# Patient Record
Sex: FEMALE | Race: WHITE | NOT HISPANIC OR LATINO | Employment: FULL TIME | URBAN - NONMETROPOLITAN AREA
[De-identification: names, ages, dates, MRNs, and addresses within clinical notes are randomized per-mention and may not be internally consistent; named-entity substitution may affect disease eponyms.]

---

## 2022-01-10 ENCOUNTER — TELEPHONE (OUTPATIENT)
Dept: UROLOGY | Facility: CLINIC | Age: 37
End: 2022-01-10

## 2022-02-11 ENCOUNTER — OFFICE VISIT (OUTPATIENT)
Dept: GASTROENTEROLOGY | Facility: CLINIC | Age: 37
End: 2022-02-11

## 2022-02-11 VITALS
HEIGHT: 69 IN | WEIGHT: 148.6 LBS | HEART RATE: 78 BPM | DIASTOLIC BLOOD PRESSURE: 60 MMHG | SYSTOLIC BLOOD PRESSURE: 102 MMHG | BODY MASS INDEX: 22.01 KG/M2

## 2022-02-11 DIAGNOSIS — Z12.11 ENCOUNTER FOR SCREENING FOR MALIGNANT NEOPLASM OF COLON: Primary | ICD-10-CM

## 2022-02-11 PROCEDURE — S0285 CNSLT BEFORE SCREEN COLONOSC: HCPCS | Performed by: PHYSICIAN ASSISTANT

## 2022-02-11 RX ORDER — BISACODYL 5 MG/1
20 TABLET, DELAYED RELEASE ORAL ONCE
Qty: 4 TABLET | Refills: 0 | Status: SHIPPED | OUTPATIENT
Start: 2022-02-11 | End: 2022-02-11

## 2022-02-11 RX ORDER — MAGNESIUM CARB/ALUMINUM HYDROX 105-160MG
296 TABLET,CHEWABLE ORAL TAKE AS DIRECTED
Qty: 592 ML | Refills: 0 | Status: SHIPPED | OUTPATIENT
Start: 2022-02-11 | End: 2022-03-16 | Stop reason: HOSPADM

## 2022-02-11 NOTE — PROGRESS NOTES
Chief Complaint   Patient presents with   • Weight Loss       Rosibel Gan is a 36 y.o. female who presents to the office today for evaluation of Weight Loss  .    HPI  Patient presents the clinic today for colonoscopy and weight loss.  Patient states that she has a paternal uncle who was diagnosed with colon cancer in his early 30s and passed due to it.  She is wanting to have a screening colonoscopy performed.  Patient's father also has had several colon polyps removed-unsure if these are precancerous or not.  Patient states that she also had Covid back in July and had a 10 to 15 pound weight loss that has leveled out.  Patient denies any issues with appetite.  She has since gained some of that weight back.    Review of Systems   Constitutional: Positive for unexpected weight change.   HENT: Negative for trouble swallowing.    Eyes: Negative.    Respiratory: Negative for chest tightness.    Cardiovascular: Negative for chest pain.   Gastrointestinal: Positive for abdominal distention and constipation. Negative for abdominal pain, anal bleeding, blood in stool, diarrhea, nausea and vomiting.   Endocrine: Negative.    Genitourinary: Negative for difficulty urinating.   Musculoskeletal: Negative.    Skin: Negative.    Allergic/Immunologic: Negative.    Neurological: Negative for headaches.   Hematological: Does not bruise/bleed easily.   Psychiatric/Behavioral: Negative.        ACTIVE PROBLEMS:   Specialty Problems     None          PAST MEDICAL HISTORY:  History reviewed. No pertinent past medical history.    SURGICAL HISTORY:  Past Surgical History:   Procedure Laterality Date   • ECTOPIC PREGNANCY SURGERY     • TONSILLECTOMY         FAMILY HISTORY:  Family History   Problem Relation Age of Onset   • Colon cancer Paternal Uncle        SOCIAL HISTORY:  Social History     Tobacco Use   • Smoking status: Never Smoker   • Smokeless tobacco: Never Used   Substance Use Topics   • Alcohol use: Not on file       CURRENT  "MEDICATION:    Current Outpatient Medications:   •  magnesium citrate 1.745 GM/30ML solution solution, Take 296 mL by mouth Take As Directed for 2 doses. Take one full bottle at 4:00 PM and take second bottle at 10:00 PM., Disp: 592 mL, Rfl: 0    ALLERGIES:  Patient has no known allergies.    VISIT VITALS:  /60 (BP Location: Left arm, Patient Position: Sitting, Cuff Size: Adult)   Pulse 78   Ht 175.3 cm (69\")   Wt 67.4 kg (148 lb 9.6 oz)   BMI 21.94 kg/m²   Physical Exam  Constitutional:       General: She is not in acute distress.     Appearance: Normal appearance. She is well-developed.   HENT:      Head: Normocephalic and atraumatic.   Eyes:      Pupils: Pupils are equal, round, and reactive to light.   Cardiovascular:      Rate and Rhythm: Normal rate and regular rhythm.      Heart sounds: Normal heart sounds.   Pulmonary:      Effort: Pulmonary effort is normal. No respiratory distress.      Breath sounds: Normal breath sounds. No wheezing, rhonchi or rales.   Abdominal:      General: Abdomen is flat. Bowel sounds are normal. There is no distension.      Palpations: Abdomen is soft. There is no mass.      Tenderness: There is no abdominal tenderness. There is no guarding or rebound.      Hernia: No hernia is present.   Musculoskeletal:         General: No swelling. Normal range of motion.      Cervical back: Normal range of motion and neck supple.      Right lower leg: No edema.      Left lower leg: No edema.   Skin:     General: Skin is warm and dry.   Neurological:      Mental Status: She is alert and oriented to person, place, and time.   Psychiatric:         Attention and Perception: Attention normal.         Mood and Affect: Mood normal.         Speech: Speech normal.         Behavior: Behavior normal. Behavior is cooperative.         Thought Content: Thought content normal.         Assessment/Plan   I will go ahead and get the patient set up to have a screening colonoscopy performed by " Tristan using a magnesium citrate Dulcolax colon prep..  The procedure was thoroughly explained to the patient she voiced understanding and agreement to the treatment plan.   Diagnosis Plan   1. Encounter for screening for malignant neoplasm of colon  magnesium citrate 1.745 GM/30ML solution solution    bisacodyl (DULCOLAX) 5 MG EC tablet    Case Request    Case Request       Return After procedure.                   This document has been electronically signed by Darvin Noguera PA-C  February 14, 2022 12:49 EST    Part of this note may be an electronic transcription/translation of spoken language to printed text using the Dragon Dictation System.

## 2022-02-16 DIAGNOSIS — Z12.11 ENCOUNTER FOR SCREENING FOR MALIGNANT NEOPLASM OF COLON: Primary | ICD-10-CM

## 2022-03-08 PROBLEM — Z12.11 ENCOUNTER FOR SCREENING FOR MALIGNANT NEOPLASM OF COLON: Status: ACTIVE | Noted: 2022-03-08

## 2022-03-14 ENCOUNTER — APPOINTMENT (OUTPATIENT)
Dept: LAB | Facility: HOSPITAL | Age: 37
End: 2022-03-14

## 2022-03-14 ENCOUNTER — CLINICAL SUPPORT (OUTPATIENT)
Dept: FAMILY MEDICINE CLINIC | Facility: CLINIC | Age: 37
End: 2022-03-14

## 2022-03-14 DIAGNOSIS — Z12.11 ENCOUNTER FOR SCREENING FOR MALIGNANT NEOPLASM OF COLON: ICD-10-CM

## 2022-03-14 PROCEDURE — U0004 COV-19 TEST NON-CDC HGH THRU: HCPCS | Performed by: INTERNAL MEDICINE

## 2022-03-15 LAB — SARS-COV-2 RNA PNL SPEC NAA+PROBE: NOT DETECTED

## 2022-03-16 ENCOUNTER — HOSPITAL ENCOUNTER (OUTPATIENT)
Facility: HOSPITAL | Age: 37
Setting detail: HOSPITAL OUTPATIENT SURGERY
Discharge: HOME | End: 2022-03-16
Attending: INTERNAL MEDICINE | Admitting: INTERNAL MEDICINE

## 2022-03-16 ENCOUNTER — ANESTHESIA EVENT (OUTPATIENT)
Dept: PERIOP | Facility: HOSPITAL | Age: 37
End: 2022-03-16

## 2022-03-16 ENCOUNTER — ANESTHESIA (OUTPATIENT)
Dept: PERIOP | Facility: HOSPITAL | Age: 37
End: 2022-03-16

## 2022-03-16 VITALS
TEMPERATURE: 97.8 F | OXYGEN SATURATION: 98 % | RESPIRATION RATE: 20 BRPM | WEIGHT: 145 LBS | BODY MASS INDEX: 21.48 KG/M2 | HEIGHT: 69 IN | SYSTOLIC BLOOD PRESSURE: 100 MMHG | DIASTOLIC BLOOD PRESSURE: 64 MMHG | HEART RATE: 69 BPM

## 2022-03-16 DIAGNOSIS — Z12.11 ENCOUNTER FOR SCREENING FOR MALIGNANT NEOPLASM OF COLON: ICD-10-CM

## 2022-03-16 LAB
B-HCG UR QL: NEGATIVE
EXPIRATION DATE: NORMAL
INTERNAL NEGATIVE CONTROL: NEGATIVE
INTERNAL POSITIVE CONTROL: POSITIVE
Lab: NORMAL

## 2022-03-16 PROCEDURE — 25010000002 PROPOFOL 10 MG/ML EMULSION: Performed by: NURSE ANESTHETIST, CERTIFIED REGISTERED

## 2022-03-16 PROCEDURE — 81025 URINE PREGNANCY TEST: CPT | Performed by: ANESTHESIOLOGY

## 2022-03-16 PROCEDURE — 25010000002 FENTANYL CITRATE (PF) 50 MCG/ML SOLUTION: Performed by: NURSE ANESTHETIST, CERTIFIED REGISTERED

## 2022-03-16 PROCEDURE — 45378 DIAGNOSTIC COLONOSCOPY: CPT | Performed by: INTERNAL MEDICINE

## 2022-03-16 RX ORDER — SODIUM CHLORIDE, SODIUM LACTATE, POTASSIUM CHLORIDE, CALCIUM CHLORIDE 600; 310; 30; 20 MG/100ML; MG/100ML; MG/100ML; MG/100ML
125 INJECTION, SOLUTION INTRAVENOUS ONCE
Status: COMPLETED | OUTPATIENT
Start: 2022-03-16 | End: 2022-03-16

## 2022-03-16 RX ORDER — SODIUM CHLORIDE 0.9 % (FLUSH) 0.9 %
10 SYRINGE (ML) INJECTION AS NEEDED
Status: DISCONTINUED | OUTPATIENT
Start: 2022-03-16 | End: 2022-03-16 | Stop reason: HOSPADM

## 2022-03-16 RX ORDER — SODIUM CHLORIDE 0.9 % (FLUSH) 0.9 %
10 SYRINGE (ML) INJECTION EVERY 12 HOURS SCHEDULED
Status: DISCONTINUED | OUTPATIENT
Start: 2022-03-16 | End: 2022-03-16 | Stop reason: HOSPADM

## 2022-03-16 RX ORDER — KETOROLAC TROMETHAMINE 30 MG/ML
30 INJECTION, SOLUTION INTRAMUSCULAR; INTRAVENOUS EVERY 6 HOURS PRN
Status: DISCONTINUED | OUTPATIENT
Start: 2022-03-16 | End: 2022-03-16 | Stop reason: HOSPADM

## 2022-03-16 RX ORDER — DROPERIDOL 2.5 MG/ML
0.62 INJECTION, SOLUTION INTRAMUSCULAR; INTRAVENOUS ONCE AS NEEDED
Status: DISCONTINUED | OUTPATIENT
Start: 2022-03-16 | End: 2022-03-16 | Stop reason: HOSPADM

## 2022-03-16 RX ORDER — PROPOFOL 10 MG/ML
VIAL (ML) INTRAVENOUS CONTINUOUS PRN
Status: DISCONTINUED | OUTPATIENT
Start: 2022-03-16 | End: 2022-03-16 | Stop reason: SURG

## 2022-03-16 RX ORDER — SODIUM CHLORIDE, SODIUM LACTATE, POTASSIUM CHLORIDE, CALCIUM CHLORIDE 600; 310; 30; 20 MG/100ML; MG/100ML; MG/100ML; MG/100ML
100 INJECTION, SOLUTION INTRAVENOUS ONCE AS NEEDED
Status: DISCONTINUED | OUTPATIENT
Start: 2022-03-16 | End: 2022-03-16 | Stop reason: HOSPADM

## 2022-03-16 RX ORDER — MEPERIDINE HYDROCHLORIDE 25 MG/ML
12.5 INJECTION INTRAMUSCULAR; INTRAVENOUS; SUBCUTANEOUS
Status: DISCONTINUED | OUTPATIENT
Start: 2022-03-16 | End: 2022-03-16 | Stop reason: HOSPADM

## 2022-03-16 RX ORDER — FENTANYL CITRATE 50 UG/ML
50 INJECTION, SOLUTION INTRAMUSCULAR; INTRAVENOUS
Status: DISCONTINUED | OUTPATIENT
Start: 2022-03-16 | End: 2022-03-16 | Stop reason: HOSPADM

## 2022-03-16 RX ORDER — MIDAZOLAM HYDROCHLORIDE 1 MG/ML
1 INJECTION INTRAMUSCULAR; INTRAVENOUS
Status: DISCONTINUED | OUTPATIENT
Start: 2022-03-16 | End: 2022-03-16 | Stop reason: HOSPADM

## 2022-03-16 RX ORDER — ONDANSETRON 2 MG/ML
4 INJECTION INTRAMUSCULAR; INTRAVENOUS AS NEEDED
Status: DISCONTINUED | OUTPATIENT
Start: 2022-03-16 | End: 2022-03-16 | Stop reason: HOSPADM

## 2022-03-16 RX ORDER — OXYCODONE HYDROCHLORIDE AND ACETAMINOPHEN 5; 325 MG/1; MG/1
1 TABLET ORAL ONCE AS NEEDED
Status: DISCONTINUED | OUTPATIENT
Start: 2022-03-16 | End: 2022-03-16 | Stop reason: HOSPADM

## 2022-03-16 RX ORDER — FENTANYL CITRATE 50 UG/ML
INJECTION, SOLUTION INTRAMUSCULAR; INTRAVENOUS AS NEEDED
Status: DISCONTINUED | OUTPATIENT
Start: 2022-03-16 | End: 2022-03-16 | Stop reason: SURG

## 2022-03-16 RX ORDER — IPRATROPIUM BROMIDE AND ALBUTEROL SULFATE 2.5; .5 MG/3ML; MG/3ML
3 SOLUTION RESPIRATORY (INHALATION) ONCE AS NEEDED
Status: DISCONTINUED | OUTPATIENT
Start: 2022-03-16 | End: 2022-03-16 | Stop reason: HOSPADM

## 2022-03-16 RX ADMIN — SODIUM CHLORIDE, POTASSIUM CHLORIDE, SODIUM LACTATE AND CALCIUM CHLORIDE: 600; 310; 30; 20 INJECTION, SOLUTION INTRAVENOUS at 09:47

## 2022-03-16 RX ADMIN — FENTANYL CITRATE 50 MCG: 50 INJECTION INTRAMUSCULAR; INTRAVENOUS at 09:50

## 2022-03-16 RX ADMIN — EPHEDRINE SULFATE 5 MG: 50 INJECTION, SOLUTION INTRAVENOUS at 10:16

## 2022-03-16 RX ADMIN — FENTANYL CITRATE 50 MCG: 50 INJECTION INTRAMUSCULAR; INTRAVENOUS at 09:55

## 2022-03-16 RX ADMIN — EPHEDRINE SULFATE 5 MG: 50 INJECTION, SOLUTION INTRAVENOUS at 10:07

## 2022-03-16 RX ADMIN — PROPOFOL 200 MCG/KG/MIN: 10 INJECTION, EMULSION INTRAVENOUS at 09:50

## 2022-03-16 NOTE — ANESTHESIA PREPROCEDURE EVALUATION
Anesthesia Evaluation     Patient summary reviewed and Nursing notes reviewed   no history of anesthetic complications:  NPO Solid Status: > 8 hours  NPO Liquid Status: > 8 hours           Airway   Mallampati: II  TM distance: >3 FB  Neck ROM: full  No difficulty expected  Dental - normal exam     Pulmonary - negative pulmonary ROS and normal exam   Cardiovascular - negative cardio ROS and normal exam  Exercise tolerance: good (4-7 METS)    NYHA Classification: II        Neuro/Psych- negative ROS  GI/Hepatic/Renal/Endo - negative ROS     Musculoskeletal (-) negative ROS    Abdominal  - normal exam    Bowel sounds: normal.   Substance History - negative use     OB/GYN negative ob/gyn ROS         Other - negative ROS                     Anesthesia Plan    ASA 2     general     intravenous induction     Anesthetic plan, all risks, benefits, and alternatives have been provided, discussed and informed consent has been obtained with: patient.        CODE STATUS:

## 2022-03-16 NOTE — ANESTHESIA POSTPROCEDURE EVALUATION
Patient: Rosibel Gan    Procedure Summary     Date: 03/16/22 Room / Location:  [facility]  /  [facility]    Anesthesia Start: 0948 Anesthesia Stop: 1025    Procedure: COLONOSCOPY (N/A ) Diagnosis:       Encounter for screening for malignant neoplasm of colon      (Encounter for screening for malignant neoplasm of colon [Z12.11])    Surgeons: Teressa Rose MD Provider: Juan J Reese MD    Anesthesia Type: general ASA Status: 2          Anesthesia Type: general    Vitals  Vitals Value Taken Time   /64 03/16/22 1055   Temp 97.8 °F (36.6 °C) 03/16/22 1025   Pulse 69 03/16/22 1055   Resp 20 03/16/22 1055   SpO2 98 % 03/16/22 1055           Post Anesthesia Care and Evaluation    Patient location during evaluation: PHASE II  Patient participation: complete - patient participated  Level of consciousness: confused  Pain score: 0  Pain management: adequate  Airway patency: patent  Anesthetic complications: No anesthetic complications    Cardiovascular status: acceptable  Respiratory status: acceptable  Hydration status: acceptable

## 2022-03-16 NOTE — H&P
[facility]    Patient Identification:  Name:  Rosibel Gan  Age:  36 y.o.  Sex:  female  :  1985  MRN:  [id number]   Visit Number:  [id number]  Primary Care Physician:  Isabelle Nuñez MD       Chief complaint:     History of presenting illness:  36 y.o. female  presents the today for colonoscopy due to unusual weight loss.    She is very concerned because she had a paternal uncle who was diagnosed and ultimately  with colon cancer in his early 30s.  Her father has had colon polyps. Patient states that she also had Covid back in July and had a 10 to 15 pound weight loss that has leveled out.  Patient denies any issues with appetite.  She has since gained some of that weight back.    She denies diarrhea or constipation.  Patient has had no bright red blood per rectum or melena.  ---------------------------------------------------------------------------------------------------------------------   Review of Systems I have reviewed and confirmed the accuracy of the ROS as documented by the MA/ANNA MARIE/IAN Rose MD    ---------------------------------------------------------------------------------------------------------------------   History reviewed. No pertinent past medical history.  Past Surgical History:   Procedure Laterality Date   • ECTOPIC PREGNANCY SURGERY     • TONSILLECTOMY       Family History   Problem Relation Age of Onset   • Colon cancer Paternal Uncle      Social History     Socioeconomic History   • Marital status:    Tobacco Use   • Smoking status: Never Smoker   • Smokeless tobacco: Never Used   Vaping Use   • Vaping Use: Never used   Substance and Sexual Activity   • Alcohol use: Yes     Comment: ocassional social drinker   • Drug use: Never   • Sexual activity: Defer     ---------------------------------------------------------------------------------------------------------------------   Allergies:  Patient has no  known allergies.  ---------------------------------------------------------------------------------------------------------------------   Prior to Admission Medications     Prescriptions Last Dose Informant Patient Reported? Taking?    magnesium citrate 1.745 GM/30ML solution solution   No No    Take 296 mL by mouth Take As Directed for 2 doses. Take one full bottle at 4:00 PM and take second bottle at 10:00 PM.        Hospital Scheduled Meds:    No current facility-administered medications for this encounter.    ---------------------------------------------------------------------------------------------------------------------   Vital Signs:     There were no vitals filed for this visit.  There is no height or weight on file to calculate BMI.  ---------------------------------------------------------------------------------------------------------------------   Physical Exam:  Constitutional:  Well-developed and well-nourished.  No respiratory distress.      HENT:  Head: Normocephalic and atraumatic.  Mouth:  Moist mucous membranes.    Eyes:  Conjunctivae and EOM are normal.  Pupils are equal, round, and reactive to light.  No scleral icterus.  Neck:  Neck supple.  No JVD present.    Cardiovascular:  Normal rate, regular rhythm and normal heart sounds with no murmur.  Pulmonary/Chest:  No respiratory distress, no wheezes, no crackles, with normal breath sounds and good air movement.  Abdominal:  Soft.  Bowel sounds are normal.  No distension and no tenderness.   Musculoskeletal:  No edema, no tenderness, and no deformity.  No red or swollen joints anywhere.    Neurological:  Alert and oriented to person, place, and time.  No cranial nerve deficit.  No tongue deviation.  No facial droop.  No slurred speech.   Skin:  Skin is warm and dry.  No rash noted.  No pallor.   Psychiatric:  Normal mood and affect.  Behavior is normal.  Judgment and thought content normal.   Peripheral vascular:  No edema and strong pulses on  all 4 extremities.  Genitourinary:  ---------------------------------------------------------------------------------------------------------------------        Invalid input(s): PROTCrCl cannot be calculated (No successful lab value found.).  No results found for: AMMONIA          No results found for: HGBA1C  No results found for: TSH, FREET4  No results found for: PREGTESTUR, PREGSERUM, HCG, HCGQUANT  Pain Management Panel    There is no flowsheet data to display.       No results found for: BLOODCX  No results found for: URINECX  No results found for: WOUNDCX  No results found for: STOOLCX      ---------------------------------------------------------------------------------------------------------------------  Imaging Results (Last 7 Days)     ** No results found for the last 168 hours. **          I have personally reviewed the radiology images and read the final radiology report.  ---------------------------------------------------------------------------------------------------------------------  Assessment and Plan:  Proceed with colonoscopy due to family history of colon cancer and colon polyps.  Teressa Rose MD  03/16/22  09:02 EDT

## 2024-07-24 ENCOUNTER — OFFICE VISIT (OUTPATIENT)
Dept: CARDIOLOGY | Facility: CLINIC | Age: 39
End: 2024-07-24
Payer: COMMERCIAL

## 2024-07-24 VITALS
DIASTOLIC BLOOD PRESSURE: 70 MMHG | HEART RATE: 75 BPM | SYSTOLIC BLOOD PRESSURE: 110 MMHG | HEIGHT: 69 IN | BODY MASS INDEX: 22.39 KG/M2 | WEIGHT: 151.2 LBS | OXYGEN SATURATION: 99 %

## 2024-07-24 DIAGNOSIS — R06.02 SHORTNESS OF BREATH: ICD-10-CM

## 2024-07-24 DIAGNOSIS — R00.2 PALPITATIONS: ICD-10-CM

## 2024-07-24 DIAGNOSIS — R00.0 TACHYCARDIA: Primary | ICD-10-CM

## 2024-07-24 PROCEDURE — 99204 OFFICE O/P NEW MOD 45 MIN: CPT | Performed by: PHYSICIAN ASSISTANT

## 2024-07-24 RX ORDER — METHOTREXATE 2.5 MG/1
2.5 TABLET ORAL
COMMUNITY
Start: 2024-07-05

## 2024-07-24 RX ORDER — FOLIC ACID 1 MG/1
1 TABLET ORAL DAILY
COMMUNITY
Start: 2024-07-05

## 2024-07-24 RX ORDER — BUPROPION HYDROCHLORIDE 150 MG/1
150 TABLET ORAL EVERY MORNING
COMMUNITY
Start: 2024-07-05

## 2024-07-24 NOTE — PROGRESS NOTES
Subjective   Rosibel Gan is a 39 y.o. female     Chief Complaint   Patient presents with    [facility]     Cardiac eval     Rapid Heart Rate       HPI    The patient presents in the clinic today to establish cardiovascular care.  This very pleasant patient is referred because of tachycardia.  The patient is a local dentist.  She reports a longstanding history of tachycardia, concerning for SVT by description.  While at work today, she again had immediate onset tachycardia.  She went to a neighboring clinic where telemetry was performed.  By report, SVT was indicated.  We are attempting to obtain formal telemetry strips.  We have 1 strip which does suggest SVT.  We are awaiting full telemetry.  She did attempt physical maneuvers to offset her tachycardia, primarily including Valsalva.  Symptoms persistent for several minutes but eventually subsided spontaneously.  She has now been referred on for evaluation.  Again, the patient has chronic episodes of tachycardia.  She reports an abrupt onset of tachycardia which tends to last no more than 30 to 60 seconds.  Occasionally this can persist longer.  She notes that this occurs typically monthly.  She has chest tightness only when episodes are little more severe.  She has no syncope but rarely has associated dizziness.  She has no further complaints at this time.  She did have previous laboratories and we are attempting to obtain results.    Current Outpatient Medications   Medication Sig Dispense Refill    buPROPion XL (WELLBUTRIN XL) 150 MG 24 hr tablet Take 1 tablet by mouth Every Morning.      folic acid (FOLVITE) 1 MG tablet Take 1 tablet by mouth Daily.      methotrexate 2.5 MG tablet 1 tablet. Takes 6 tablets weekly       No current facility-administered medications for this visit.       Patient has no known allergies.    Past Medical History:   Diagnosis Date    Insomnia     Morphea        Social History     Socioeconomic History    Marital status:   "  Tobacco Use    Smoking status: Never    Smokeless tobacco: Never   Vaping Use    Vaping status: Never Used   Substance and Sexual Activity    Alcohol use: Yes     Comment: ocassional social drinker    Drug use: Never    Sexual activity: Defer       Family History   Problem Relation Age of Onset    Hypertension Mother     Hyperlipidemia Father     Hypertension Father     Colon cancer Paternal Uncle        Review of Systems   Constitutional:  Positive for fatigue. Negative for chills, diaphoresis and fever.   HENT: Negative.     Eyes: Negative.  Negative for visual disturbance.   Respiratory: Negative.  Negative for apnea, cough, chest tightness, shortness of breath and wheezing.    Cardiovascular:  Positive for palpitations. Negative for chest pain and leg swelling.   Gastrointestinal: Negative.  Negative for abdominal pain and blood in stool.   Endocrine: Negative.    Genitourinary: Negative.  Negative for hematuria.   Musculoskeletal: Negative.  Negative for arthralgias, back pain, myalgias, neck pain and neck stiffness.   Skin: Negative.  Negative for rash and wound.   Allergic/Immunologic: Negative.  Negative for environmental allergies and food allergies.   Neurological:  Positive for dizziness. Negative for syncope, weakness, light-headedness, numbness and headaches.   Hematological: Negative.  Bruises/bleeds easily.   Psychiatric/Behavioral: Negative.  Negative for sleep disturbance.        Objective     Vitals:    07/24/24 1621   BP: 110/70   Pulse: 75   SpO2: 99%   Weight: 68.6 kg (151 lb 3.2 oz)   Height: 175.3 cm (69\")        /70   Pulse 75   Ht 175.3 cm (69\")   Wt 68.6 kg (151 lb 3.2 oz)   SpO2 99%   BMI 22.33 kg/m²      Lab Results (most recent)       None            Physical Exam  Vitals and nursing note reviewed.   Constitutional:       General: She is not in acute distress.     Appearance: She is well-developed.   HENT:      Head: Normocephalic and atraumatic.   Eyes:      " Conjunctiva/sclera: Conjunctivae normal.      Pupils: Pupils are equal, round, and reactive to light.   Neck:      Vascular: No JVD.      Trachea: No tracheal deviation.   Cardiovascular:      Rate and Rhythm: Normal rate and regular rhythm.      Heart sounds: Normal heart sounds.   Pulmonary:      Effort: Pulmonary effort is normal.      Breath sounds: Normal breath sounds.   Abdominal:      General: Bowel sounds are normal. There is no distension.      Palpations: Abdomen is soft. There is no mass.      Tenderness: There is no abdominal tenderness. There is no guarding or rebound.   Musculoskeletal:         General: No tenderness or deformity. Normal range of motion.      Cervical back: Normal range of motion and neck supple.   Skin:     General: Skin is warm and dry.      Coloration: Skin is not pale.      Findings: No erythema or rash.   Neurological:      Mental Status: She is alert and oriented to person, place, and time.   Psychiatric:         Behavior: Behavior normal.         Thought Content: Thought content normal.         Judgment: Judgment normal.         Procedure   Procedures         Assessment & Plan      Diagnosis Plan   1. Tachycardia  Adult Transthoracic Echo Complete W/ Cont if Necessary Per Protocol    Treadmill Stress Test    [facility] Tele[facility]      2. Palpitations  Adult Transthoracic Echo Complete W/ Cont if Necessary Per Protocol    Treadmill Stress Test    [facility] Telemetry      3. Shortness of breath  Adult Transthoracic Echo Complete W/ Cont if Necessary Per Protocol    Treadmill Stress Test    [facility]patient Telemetry        1.  The patient presents to establish care.  She has longstanding episodes of tachycardia, SVT by description.  She had an episode today and was evaluated by telemetry.  We are awaiting full telemetry findings.  What is available does suggest SVT.  We have reviewed this in detail with her.    2.  I feel she needs further  restratification.  She has associated symptoms with her episodes of tachycardia as above.  We will schedule for treadmill stress test just for risk stratification.    3.  I would also schedule for an echo.  We can evaluate LV size and function, valvular morphologies, and cardiac structure otherwise.    4.  We will schedule for an event monitor to evaluate for SVT as suggested above, or for rate or rhythm disturbance issues otherwise.    5.  Recent laboratories including chemistry, hematologic parameters, and thyroid studies reportedly were unremarkable.  I do not feel further laboratory evaluation is indicated in this setting.    6.  I have offered consideration for suppressive therapies, to include beta-blocker, etc.  We have even discussed EP evaluation.  She reports that symptoms are infrequent enough that she would not want to pursue any medications or consultation at this time.  We can await results of the above and discuss this with her further at that time.  In the interim, we have discussed appropriate lifestyle modifications, physical maneuvers for SVT, etc.                 Electronically signed by:

## 2024-08-20 ENCOUNTER — TELEPHONE (OUTPATIENT)
Dept: CARDIOLOGY | Facility: CLINIC | Age: 39
End: 2024-08-20

## 2024-09-24 ENCOUNTER — TELEPHONE (OUTPATIENT)
Dept: CARDIOLOGY | Facility: CLINIC | Age: 39
End: 2024-09-24
Payer: COMMERCIAL

## 2024-10-04 ENCOUNTER — HOSPITAL ENCOUNTER (OUTPATIENT)
Dept: CARDIOLOGY | Facility: HOSPITAL | Age: 39
Discharge: HOME | End: 2024-10-04
Payer: COMMERCIAL

## 2024-10-04 DIAGNOSIS — R06.02 SHORTNESS OF BREATH: ICD-10-CM

## 2024-10-04 DIAGNOSIS — R00.0 TACHYCARDIA: ICD-10-CM

## 2024-10-04 DIAGNOSIS — R00.2 PALPITATIONS: ICD-10-CM

## 2024-10-04 LAB
BH CV ECHO MEAS - ACS: 1.93 CM
BH CV ECHO MEAS - AO MAX PG: 6.8 MMHG
BH CV ECHO MEAS - AO MEAN PG: 4.3 MMHG
BH CV ECHO MEAS - AO ROOT DIAM: 2.6 CM
BH CV ECHO MEAS - AO V2 MAX: 130.3 CM/SEC
BH CV ECHO MEAS - AO V2 VTI: 29.4 CM
BH CV ECHO MEAS - EDV(CUBED): 83.5 ML
BH CV ECHO MEAS - EDV(MOD-SP4): 87.5 ML
BH CV ECHO MEAS - EF(MOD-SP4): 56.9 %
BH CV ECHO MEAS - EF_3D-VOL: 67 %
BH CV ECHO MEAS - ESV(CUBED): 16.8 ML
BH CV ECHO MEAS - ESV(MOD-SP4): 37.7 ML
BH CV ECHO MEAS - FS: 41.4 %
BH CV ECHO MEAS - IVS/LVPW: 0.93 CM
BH CV ECHO MEAS - IVSD: 0.8 CM
BH CV ECHO MEAS - LA DIMENSION: 3.4 CM
BH CV ECHO MEAS - LAT PEAK E' VEL: 16.8 CM/SEC
BH CV ECHO MEAS - LV DIASTOLIC VOL/BSA (35-75): 47.7 CM2
BH CV ECHO MEAS - LV MASS(C)D: 114 GRAMS
BH CV ECHO MEAS - LV SYSTOLIC VOL/BSA (12-30): 20.6 CM2
BH CV ECHO MEAS - LVIDD: 4.4 CM
BH CV ECHO MEAS - LVIDS: 2.6 CM
BH CV ECHO MEAS - LVPWD: 0.86 CM
BH CV ECHO MEAS - MED PEAK E' VEL: 12.1 CM/SEC
BH CV ECHO MEAS - MV A MAX VEL: 48.4 CM/SEC
BH CV ECHO MEAS - MV DEC TIME: 0.29 SEC
BH CV ECHO MEAS - MV E MAX VEL: 75.4 CM/SEC
BH CV ECHO MEAS - MV E/A: 1.56
BH CV ECHO MEAS - RVDD: 2.7 CM
BH CV ECHO MEAS - SV(MOD-SP4): 49.8 ML
BH CV ECHO MEAS - SVI(MOD-SP4): 27.2 ML/M2
BH CV ECHO MEASUREMENTS AVERAGE E/E' RATIO: 5.22
BH CV STRESS RECOVERY BP: NORMAL MMHG
BH CV STRESS RECOVERY HR: 93 BPM
LEFT ATRIUM VOLUME INDEX: 18 ML/M2
MAXIMAL PREDICTED HEART RATE: 180 BPM
PERCENT MAX PREDICTED HR: 107.22 %
STRESS BASELINE BP: NORMAL MMHG
STRESS BASELINE HR: 72 BPM
STRESS PERCENT HR: 126 %
STRESS POST ESTIMATED WORKLOAD: 10.1 METS
STRESS POST EXERCISE DUR MIN: 5 MIN
STRESS POST EXERCISE DUR SEC: 59 SEC
STRESS POST PEAK BP: NORMAL MMHG
STRESS POST PEAK HR: 193 BPM
STRESS TARGET HR: 153 BPM

## 2024-10-04 PROCEDURE — 93306 TTE W/DOPPLER COMPLETE: CPT

## 2024-10-04 PROCEDURE — 93017 CV STRESS TEST TRACING ONLY: CPT

## 2024-10-08 ENCOUNTER — TELEPHONE (OUTPATIENT)
Dept: CARDIOLOGY | Facility: CLINIC | Age: 39
End: 2024-10-08
Payer: COMMERCIAL

## 2024-10-08 NOTE — TELEPHONE ENCOUNTER
Left voicemail of brief results and to return call with any questions.     ----- Message from Humberto Cramer sent at 10/7/2024  5:33 PM EDT -----  A short episode of SVT noted, nothing sustained.  Routine follow-up based on this study alone.  ----- Message -----  From: Dalton Scherer MD  Sent: 9/30/2024  12:58 PM EDT  To: JANESSA Thomason    Result Text  1.  Sinus rhythm is a baseline predominant rhythm throughout the study.  Maximum heart rate was at 185 bpm, reflective of sinus tachycardia.  Minimum heart rate was at 59 bpm.  2.  PVCs with reported the monitor read out, but none included in telemetry.  3.  An episode of SVT was noted, persisting at 6.9 seconds at a rate of 150 bpm.  The patient reported symptoms with this episode, consistent with a sensation of a flutter or skipped beats.  4.  There appears to be a junctional rhythm at 60 bpm at 4:44 AM on 9/7/2024.  5.  No other arrhythmia, ectopy, or block of significance was noted.  6.  2 patient triggers were noted, correlating to symptoms.  This occurs during sinus tachycardia with reported PVCs, and the episode of SVT as above.

## 2024-10-09 LAB
BH CV STRESS RECOVERY BP: NORMAL MMHG
BH CV STRESS RECOVERY HR: 93 BPM
MAXIMAL PREDICTED HEART RATE: 180 BPM
PERCENT MAX PREDICTED HR: 107.22 %
STRESS BASELINE BP: NORMAL MMHG
STRESS BASELINE HR: 72 BPM
STRESS PERCENT HR: 126 %
STRESS POST ESTIMATED WORKLOAD: 10.1 METS
STRESS POST EXERCISE DUR MIN: 5 MIN
STRESS POST EXERCISE DUR SEC: 59 SEC
STRESS POST PEAK BP: NORMAL MMHG
STRESS POST PEAK HR: 193 BPM
STRESS TARGET HR: 153 BPM

## 2024-10-12 LAB
BH CV ECHO MEAS - ACS: 1.93 CM
BH CV ECHO MEAS - AO MAX PG: 6.8 MMHG
BH CV ECHO MEAS - AO MEAN PG: 4.3 MMHG
BH CV ECHO MEAS - AO ROOT DIAM: 2.6 CM
BH CV ECHO MEAS - AO V2 MAX: 130.3 CM/SEC
BH CV ECHO MEAS - AO V2 VTI: 29.4 CM
BH CV ECHO MEAS - EDV(CUBED): 83.5 ML
BH CV ECHO MEAS - EDV(MOD-SP4): 87.5 ML
BH CV ECHO MEAS - EF(MOD-SP4): 56.9 %
BH CV ECHO MEAS - EF_3D-VOL: 67 %
BH CV ECHO MEAS - ESV(CUBED): 16.8 ML
BH CV ECHO MEAS - ESV(MOD-SP4): 37.7 ML
BH CV ECHO MEAS - FS: 41.4 %
BH CV ECHO MEAS - IVS/LVPW: 0.93 CM
BH CV ECHO MEAS - IVSD: 0.8 CM
BH CV ECHO MEAS - LA DIMENSION: 3.4 CM
BH CV ECHO MEAS - LAT PEAK E' VEL: 16.8 CM/SEC
BH CV ECHO MEAS - LV DIASTOLIC VOL/BSA (35-75): 47.7 CM2
BH CV ECHO MEAS - LV MASS(C)D: 114 GRAMS
BH CV ECHO MEAS - LV SYSTOLIC VOL/BSA (12-30): 20.6 CM2
BH CV ECHO MEAS - LVIDD: 4.4 CM
BH CV ECHO MEAS - LVIDS: 2.6 CM
BH CV ECHO MEAS - LVPWD: 0.86 CM
BH CV ECHO MEAS - MED PEAK E' VEL: 12.1 CM/SEC
BH CV ECHO MEAS - MV A MAX VEL: 48.4 CM/SEC
BH CV ECHO MEAS - MV DEC TIME: 0.29 SEC
BH CV ECHO MEAS - MV E MAX VEL: 75.4 CM/SEC
BH CV ECHO MEAS - MV E/A: 1.56
BH CV ECHO MEAS - RVDD: 2.7 CM
BH CV ECHO MEAS - SV(MOD-SP4): 49.8 ML
BH CV ECHO MEAS - SVI(MOD-SP4): 27.2 ML/M2
BH CV ECHO MEASUREMENTS AVERAGE E/E' RATIO: 5.22
LEFT ATRIUM VOLUME INDEX: 18 ML/M2

## 2024-10-14 ENCOUNTER — TELEPHONE (OUTPATIENT)
Dept: CARDIOLOGY | Facility: CLINIC | Age: 39
End: 2024-10-14
Payer: COMMERCIAL

## 2024-10-14 NOTE — TELEPHONE ENCOUNTER
HUB to Relay  Called and LVM regarding stress test results  No acute findings. Provider will discuss results at f/u. Please remind pt of appt date and time.     Treadmill Stress Test  Order: [id number]  Status: Final result       Visible to patient: Yes (not seen)       Dx: Palpitations; Shortness of breath; Ta...    0 Result Notes       1 Follow-up Encounter  Details    Reading Physician Reading Date Result Priority   Dalton Scherer MD  [phone] 10/9/2024 Routine     Result Text  1.  Good functional capacity without chest pain.  The patient exercised 8 minutes and 59 seconds on the Ryan protocol to a heart rate of 193, systolic blood pressure 153, and O2 consumption of 10.1 METS, and to 107% of age-adjusted maximum predicted heart rate.     2.  Exaggerated heart rate and physiologic blood pressure responses to exercise.     3.  No EKG evidence of ischemia.     4.  No exercise-induced dysrhythmia.

## 2024-10-14 NOTE — TELEPHONE ENCOUNTER
----- Message from Joanne SCOTT sent at 10/14/2024  8:48 AM EDT -----    ----- Message -----  From: Humberto Cramer PA  Sent: 10/11/2024   4:32 PM EDT  To: Jamila Salinas MA    Routine follow-up.  ----- Message -----  From: Dalton Scherer MD  Sent: 10/9/2024   9:02 PM EDT  To: JANESSA Thomason

## 2024-10-14 NOTE — TELEPHONE ENCOUNTER
HUB TO RELAY  ECHO  Called patient to notify of no acute findings or abnormalities. Keep follow up as scheduled. If you have any problem between now and then give our office a call.   ----- Message from Humberto Cramer sent at 10/13/2024 11:21 PM EDT -----  Routine follow-up.  ----- Message -----  From: Dalton Scherer MD  Sent: 10/12/2024   8:09 PM EDT  To: JANESSA Thomason

## 2025-04-23 ENCOUNTER — TELEPHONE (OUTPATIENT)
Dept: CARDIOLOGY | Facility: CLINIC | Age: 40
End: 2025-04-23

## 2025-04-23 NOTE — TELEPHONE ENCOUNTER
Caller: Rosibel Gan     Relationship: Self     Best call back number: [phone]     Who is your current provider: MEET BROWN     Is your current provider offboarding? NO     Who would you like your new provider to be: FRANKIE RAMOS      What are your reasons for transferring care: PATIENT WOULD PREFER TO GO TO [facility] DUE TO A PRIOR RELATIONSHIP

## 2025-04-23 NOTE — TELEPHONE ENCOUNTER
Caller: Rosibel Gan    Relationship: Self    Best call back number: [phone]    Who is your current provider: MEET BROWN    Is your current provider offboarding? NO    Who would you like your new provider to be: FRANKIE RAMOS     What are your reasons for transferring care: PATIENT WOULD PREFER TO GO TO [facility] DUE TO A PRIOR RELATIONSHIP

## 2025-07-23 ENCOUNTER — OFFICE VISIT (OUTPATIENT)
Dept: CARDIOLOGY | Facility: CLINIC | Age: 40
End: 2025-07-23
Payer: COMMERCIAL

## 2025-07-23 VITALS
SYSTOLIC BLOOD PRESSURE: 92 MMHG | WEIGHT: 156 LBS | DIASTOLIC BLOOD PRESSURE: 58 MMHG | HEIGHT: 69 IN | BODY MASS INDEX: 23.11 KG/M2 | HEART RATE: 80 BPM

## 2025-07-23 DIAGNOSIS — R00.2 PALPITATIONS: ICD-10-CM

## 2025-07-23 DIAGNOSIS — R42 DIZZINESS: ICD-10-CM

## 2025-07-23 DIAGNOSIS — G90.A POTS (POSTURAL ORTHOSTATIC TACHYCARDIA SYNDROME): ICD-10-CM

## 2025-07-23 DIAGNOSIS — R00.0 TACHYCARDIA: Primary | ICD-10-CM

## 2025-07-23 DIAGNOSIS — I95.89 CHRONIC HYPOTENSION: ICD-10-CM

## 2025-07-23 RX ORDER — NEBIVOLOL 2.5 MG/1
2.5 TABLET ORAL DAILY
Qty: 90 TABLET | Refills: 2 | Status: SHIPPED | OUTPATIENT
Start: 2025-07-23

## 2025-07-23 RX ORDER — FLUDROCORTISONE ACETATE 0.1 MG/1
0.1 TABLET ORAL DAILY PRN
Qty: 90 TABLET | Refills: 2 | Status: SHIPPED | OUTPATIENT
Start: 2025-07-23

## 2025-07-23 NOTE — PROGRESS NOTES
Chief Complaint   Patient presents with    Establish Care     Patient wanted to establish care with our office. Last saw DAVID Kamara, last testing and note is in chart. Last lab work was with rheumatology, states that she would like to have some blood work done to check everything.     Aspirin     Patient is not on aspirin daily.     Palpitations     States that she has been having issues with heart rate going to 200 with out exercise or being upset. Happened 2 times in one day when she was in a surgery and she was hooked up to EKG and it showed SVT, saw Humberto Cramer that day. States that it is getting worse and is now happening with exercise. States that PCP has also always ran low. States that she does have dizziness, states that she has had for a while, not changed.        Cardiac Complaints  Dizziness and palpitations      Subjective   Rosibel Gan is a 40 y.o. female with palpitations, tachycardia, and possible POTS. She was originally seen in 2024 by another cardiologist for palpitations and underwent workup at that time with stress, echo, monitor. Stress was negative for EKG changes indicative of ischemia but increased chronotropic response noted (107% THR), BB was discussed, she did not wish to add. Echo showed no sig valve concerns, no LV dysfunction noted. Monitor showed one short run of SVT and several runs of sinus tach with highest HR of 186. She has not had any labs recently, would like an order. She does admit palpitations were well controlled for sometime, but has since been getting worse. Patient is very active and works out most days at [employer]. She states when they are doing cardiac workup, HR will increase quickly to 180 to 200 and then will come down to baseline within four minutes. She states this scares her and will often make her feel odd in her chest. She also reports blood pressure is often low, but is concerned with the high HR often while standing/moving. She reports one  instance of doing surgery and feeling like she would pass out/HR was elevated. Tobacco abuse denied, alcohol use only rarely.      Cardiac History  Past Surgical History:   Procedure Laterality Date    COLONOSCOPY N/A 03/16/2022    Procedure: COLONOSCOPY;  Surgeon: Teressa Rose MD;  Location: [facility];  Service: Gastroenterology;  Laterality: N/A;    ECHO - CONVERTED  10/12/2024    EF 60-65%, no valve concerns    ECTOPIC PREGNANCY SURGERY      OTHER SURGICAL HISTORY  09/30/2024    Event monitor: Baseline NSR, max , 2 PVCs 1 SVT rate 150    TONSILLECTOMY      TREADMILL STRSS TEST  10/09/2024    Baseline NSR, 107% THR, no ischemia       Current Outpatient Medications   Medication Sig Dispense Refill    fludrocortisone 0.1 MG tablet Take 1 tablet by mouth Daily As Needed (as needed for SBP <100). 90 tablet 2    nebivolol (Bystolic) 2.5 MG tablet Take 1 tablet by mouth Daily. 90 tablet 2     No current facility-administered medications for this visit.       Patient has no known allergies.    Past Medical History:   Diagnosis Date    Insomnia     Morphea        Social History     Socioeconomic History    Marital status:    Tobacco Use    Smoking status: Never    Smokeless tobacco: Never   Vaping Use    Vaping status: Never Used   Substance and Sexual Activity    Alcohol use: Yes     Comment: ocassional social drinker    Drug use: Never    Sexual activity: Defer       Family History   Problem Relation Age of Onset    Hypertension Mother     Hyperlipidemia Father     Hypertension Father     Colon cancer Paternal Uncle        Review of Systems   Constitutional: Negative for malaise/fatigue and night sweats.   Cardiovascular:  Positive for irregular heartbeat and palpitations. Negative for chest pain, claudication, dyspnea on exertion, leg swelling, near-syncope, orthopnea and syncope.   Respiratory:  Negative for cough, shortness of breath and wheezing.    Musculoskeletal:  Positive for  "stiffness. Negative for back pain and joint pain.   Gastrointestinal:  Negative for anorexia, heartburn, nausea and vomiting.   Genitourinary:  Negative for dysuria, hematuria, hesitancy and nocturia.   Neurological:  Positive for dizziness and light-headedness.   Psychiatric/Behavioral:  Negative for depression and memory loss. The patient is not nervous/anxious.            Objective     BP 92/58 (BP Location: Right arm)   Pulse 80   Ht 175.3 cm (69.02\")   Wt 70.8 kg (156 lb)   BMI 23.03 kg/m²     Constitutional:       Appearance: Not in distress.   Eyes:      Pupils: Pupils are equal, round, and reactive to light.   HENT:      Nose: Nose normal.   Pulmonary:      Effort: Pulmonary effort is normal.      Breath sounds: Normal breath sounds.   Cardiovascular:      PMI at left midclavicular line. Normal rate. Regular rhythm.      Murmurs: There is no murmur.   Musculoskeletal: Normal range of motion.      Cervical back: Normal range of motion and neck supple. Skin:     General: Skin is warm and dry.   Neurological:      Mental Status: Alert.           ECG 12 Lead    Date/Time: 7/23/2025 2:59 PM  Performed by: Rhina Dunn APRN    Authorized by: Rhina Dunn APRN  Comparison: compared with previous ECG from 7/30/2024  Comparison to previous ECG: Similar to prior  Rhythm: sinus rhythm  BPM: 80  Other findings: non-specific ST-T wave changes    Clinical impression: non-specific ECG  Comments: Normal QT               Diagnoses and all orders for this visit:    1. Tachycardia (Primary)  -     Cancel: Tilt Table; Future  -     Holter Monitor - 72 Hour Up To 15 Days; Future  -     Tilt Table; Future  -     ECG 12 Lead  -     CBC (No Diff); Future  -     Comprehensive Metabolic Panel; Future  -     Magnesium; Future  -     TSH; Future    2. POTS (postural orthostatic tachycardia syndrome)  -     Cancel: Tilt Table; Future  -     Tilt Table; Future    3. Dizziness  -     Cancel: Tilt Table; Future  -     Tilt " Table; Future  -     CBC (No Diff); Future  -     Comprehensive Metabolic Panel; Future  -     Magnesium; Future  -     TSH; Future    4. Chronic hypotension  -     Cancel: Tilt Table; Future    5. Palpitations  -     Holter Monitor - 72 Hour Up To 15 Days; Future  -     Tilt Table; Future  -     ECG 12 Lead  -     CBC (No Diff); Future  -     Comprehensive Metabolic Panel; Future  -     Magnesium; Future  -     TSH; Future    Other orders  -     fludrocortisone 0.1 MG tablet; Take 1 tablet by mouth Daily As Needed (as needed for SBP <100).  Dispense: 90 tablet; Refill: 2  -     nebivolol (Bystolic) 2.5 MG tablet; Take 1 tablet by mouth Daily.  Dispense: 90 tablet; Refill: 2               Palpitations/dizziness/tachycardia: Noted today. EKG done today showed NSR with normal QT, no ST changes. Discussed adding both low dose BB and florinef as needed for hypotension but she does not wish to add at present as she does not feel symptoms bad enough to proceed. Put on hold until after both 3 day holter back to assess for arrhythmias and her TILT results available to rule out POTS. More recommendations to follow.    Hypotension: Noted. Will add florinef 0.1mg as needed. Adequate fluid intake urged.    ? POTS: Information provided on management. TILT urged.    Lab order provided with more recommendations to follow.    BMI noted at 23.03, good cardiac diet urged.    6 month follow up urged, sooner if needed.             Problems Addressed this Visit    None  Visit Diagnoses         Tachycardia    -  Primary    Relevant Orders    Holter Monitor - 72 Hour Up To 15 Days    Tilt Table    ECG 12 Lead    CBC (No Diff)    Comprehensive Metabolic Panel    Magnesium    TSH      POTS (postural orthostatic tachycardia syndrome)        Relevant Medications    nebivolol (Bystolic) 2.5 MG tablet    Other Relevant Orders    Tilt Table      Dizziness        Relevant Orders    Tilt Table    CBC (No Diff)    Comprehensive Metabolic Panel     Magnesium    TSH      Chronic hypotension          Palpitations        Relevant Orders    Holter Monitor - 72 Hour Up To 15 Days    Tilt Table    ECG 12 Lead    CBC (No Diff)    Comprehensive Metabolic Panel    Magnesium    TSH          Diagnoses         Codes Comments      Tachycardia    -  Primary ICD-10-CM: R00.0  ICD-9-CM: 785.0       POTS (postural orthostatic tachycardia syndrome)     ICD-10-CM: G90.A  ICD-9-CM: 427.89       Dizziness     ICD-10-CM: R42  ICD-9-CM: 780.4       Chronic hypotension     ICD-10-CM: I95.89  ICD-9-CM: 458.1       Palpitations     ICD-10-CM: R00.2  ICD-9-CM: 785.1                       Electronically signed by Rhina Dunn, APRN July 23, 2025 16:10 EDT

## 2025-07-30 ENCOUNTER — PATIENT MESSAGE (OUTPATIENT)
Dept: CARDIOLOGY | Facility: CLINIC | Age: 40
End: 2025-07-30
Payer: COMMERCIAL

## 2025-07-31 NOTE — TELEPHONE ENCOUNTER
I spoke with patient.  She works on 8/22/25.  Patient does want to proceed with Tilt table on 8/29/2025.  Patient aware I will mail instruction to her home address.

## 2025-08-29 ENCOUNTER — OUTSIDE FACILITY SERVICE (OUTPATIENT)
Dept: CARDIOLOGY | Facility: CLINIC | Age: 40
End: 2025-08-29
Payer: COMMERCIAL

## (undated) DEVICE — SUCTION CANISTER, 1500CC, RIGID: Brand: DEROYAL

## (undated) DEVICE — SINGLE PORT MANIFOLD: Brand: NEPTUNE 2

## (undated) DEVICE — TUBING, SUCTION, 1/4" X 20', STRAIGHT: Brand: MEDLINE INDUSTRIES, INC.

## (undated) DEVICE — GOWN,REINF,POLY,ECL,PP SLV,XL: Brand: MEDLINE

## (undated) DEVICE — Device: Brand: DEFENDO AIR/WATER/SUCTION AND BIOPSY VALVE

## (undated) DEVICE — ENDOGATOR AUXILIARY WATER JET CONNECTOR: Brand: ENDOGATOR

## (undated) DEVICE — SYR LUERLOK 30CC

## (undated) DEVICE — ENDOGATOR TUBING FOR ENDOGATOR EGP-100 IRRIGATION PUMP,OLYMPUS OFP PUMP, OLYMPUS AFU-100 PUMP AND ERBE EIP2 PUMP: Brand: ENDOGATOR

## (undated) DEVICE — CONN Y IRR DISP 1P/U

## (undated) DEVICE — Device